# Patient Record
Sex: FEMALE | Race: WHITE | Employment: UNEMPLOYED | URBAN - METROPOLITAN AREA
[De-identification: names, ages, dates, MRNs, and addresses within clinical notes are randomized per-mention and may not be internally consistent; named-entity substitution may affect disease eponyms.]

---

## 2018-03-20 PROBLEM — R92.2 DENSE BREASTS: Status: ACTIVE | Noted: 2018-03-20

## 2018-03-20 PROBLEM — D56.3 BETA THALASSEMIA MINOR: Status: ACTIVE | Noted: 2018-03-20

## 2018-03-20 PROBLEM — D21.9 FIBROID: Status: ACTIVE | Noted: 2018-03-20

## 2018-03-22 PROBLEM — I10 WHITE COAT SYNDROME WITH HYPERTENSION: Status: ACTIVE | Noted: 2018-03-22

## 2018-03-22 PROCEDURE — 88175 CYTOPATH C/V AUTO FLUID REDO: CPT | Performed by: OBSTETRICS & GYNECOLOGY

## 2019-04-30 PROCEDURE — 88175 CYTOPATH C/V AUTO FLUID REDO: CPT | Performed by: OBSTETRICS & GYNECOLOGY

## 2021-09-13 ENCOUNTER — OFFICE VISIT (OUTPATIENT)
Dept: OBGYN CLINIC | Facility: CLINIC | Age: 51
End: 2021-09-13
Payer: COMMERCIAL

## 2021-09-13 VITALS
HEIGHT: 62.5 IN | DIASTOLIC BLOOD PRESSURE: 100 MMHG | SYSTOLIC BLOOD PRESSURE: 172 MMHG | WEIGHT: 145 LBS | BODY MASS INDEX: 26.02 KG/M2

## 2021-09-13 DIAGNOSIS — Z12.4 SCREENING FOR CERVICAL CANCER: ICD-10-CM

## 2021-09-13 DIAGNOSIS — Z01.419 ENCOUNTER FOR GYNECOLOGICAL EXAMINATION WITHOUT ABNORMAL FINDING: Primary | ICD-10-CM

## 2021-09-13 DIAGNOSIS — N92.0 MENORRHAGIA WITH REGULAR CYCLE: ICD-10-CM

## 2021-09-13 PROBLEM — D50.0 IRON DEFICIENCY ANEMIA DUE TO CHRONIC BLOOD LOSS: Status: ACTIVE | Noted: 2021-09-08

## 2021-09-13 PROCEDURE — 3077F SYST BP >= 140 MM HG: CPT | Performed by: OBSTETRICS & GYNECOLOGY

## 2021-09-13 PROCEDURE — 88175 CYTOPATH C/V AUTO FLUID REDO: CPT | Performed by: OBSTETRICS & GYNECOLOGY

## 2021-09-13 PROCEDURE — 87624 HPV HI-RISK TYP POOLED RSLT: CPT | Performed by: OBSTETRICS & GYNECOLOGY

## 2021-09-13 PROCEDURE — 3080F DIAST BP >= 90 MM HG: CPT | Performed by: OBSTETRICS & GYNECOLOGY

## 2021-09-13 PROCEDURE — 3008F BODY MASS INDEX DOCD: CPT | Performed by: OBSTETRICS & GYNECOLOGY

## 2021-09-13 PROCEDURE — 99386 PREV VISIT NEW AGE 40-64: CPT | Performed by: OBSTETRICS & GYNECOLOGY

## 2021-09-13 RX ORDER — HYDROCHLOROTHIAZIDE 12.5 MG/1
12.5 CAPSULE, GELATIN COATED ORAL DAILY
COMMUNITY
Start: 2021-07-23

## 2021-09-13 NOTE — PATIENT INSTRUCTIONS
TREATMENT FOR MENORRHAGIA HEAVY MENSTRUAL CYCLES    EVALUATION  Menorrhagia or irregular/heavy bleeding is usually first evaluated with an ultrasound and sometimes an office endometrial biopsy.   Both of these are required before performing an endometrial a most users are highly satisfied with this device. There are small risks involved with placing the device and these can be discussed with your physician. Lupron/Oriahnn Therapy:   These medications work to reduce your hormones levels similar to what happ ovaries allows your hormone levels to remain unchanged and decline naturally as your approach menopause. The route of surgery varies depending on the size and shape of your uterus as well as your previous surgical and childbirth history.   Routes of surger

## 2021-09-13 NOTE — PROGRESS NOTES
Subjective:  Patient presents with:  Physical: NP    53year old female [id number] presents for annual visit, complaining of anemia from heavy menses. Did have menses two weeks apart in August, but otherwise monthly, 5 day duration, but heavy for 1-2 days. swelling or numbness  Neuro- no numbness    Objective:  BP (!) 172/100   Ht 62.5\"   Wt 145 lb (65.8 kg)   LMP 08/26/2021 (Exact Date)   BMI 26.10 kg/m²    Physical Examination:  General appearance: Well dressed, well nourished in no apparent distress  Lew without abnormal finding    Screening for cervical cancer  -     THINPREP PAP SMEAR B; Future  -     HPV HIGH RISK , THIN PREP COLLECTION;  Future    Menorrhagia with regular cycle    Other orders  -     Cancel: [facility] KACEY 2D+3D SCREENING BILAT (CPT=77067/770

## 2021-09-15 LAB — HPV I/H RISK 1 DNA SPEC QL NAA+PROBE: NEGATIVE

## 2021-09-28 ENCOUNTER — OFFICE VISIT (OUTPATIENT)
Dept: OBGYN CLINIC | Facility: CLINIC | Age: 51
End: 2021-09-28
Payer: COMMERCIAL

## 2021-09-28 ENCOUNTER — ULTRASOUND ENCOUNTER (OUTPATIENT)
Dept: OBGYN CLINIC | Facility: CLINIC | Age: 51
End: 2021-09-28
Payer: COMMERCIAL

## 2021-09-28 VITALS
BODY MASS INDEX: 26.16 KG/M2 | WEIGHT: 145.81 LBS | DIASTOLIC BLOOD PRESSURE: 96 MMHG | SYSTOLIC BLOOD PRESSURE: 142 MMHG | HEART RATE: 83 BPM | HEIGHT: 62.5 IN

## 2021-09-28 DIAGNOSIS — Z01.812 PRE-PROCEDURAL LABORATORY EXAMINATION: Primary | ICD-10-CM

## 2021-09-28 DIAGNOSIS — D21.9 FIBROID: ICD-10-CM

## 2021-09-28 DIAGNOSIS — N92.0 MENORRHAGIA WITH REGULAR CYCLE: ICD-10-CM

## 2021-09-28 LAB
CONTROL LINE PRESENT WITH A CLEAR BACKGROUND (YES/NO): YES YES/NO
PREGNANCY TEST, URINE: NEGATIVE

## 2021-09-28 PROCEDURE — 76856 US EXAM PELVIC COMPLETE: CPT | Performed by: OBSTETRICS & GYNECOLOGY

## 2021-09-28 PROCEDURE — 81025 URINE PREGNANCY TEST: CPT | Performed by: OBSTETRICS & GYNECOLOGY

## 2021-09-28 PROCEDURE — 3080F DIAST BP >= 90 MM HG: CPT | Performed by: OBSTETRICS & GYNECOLOGY

## 2021-09-28 PROCEDURE — 76830 TRANSVAGINAL US NON-OB: CPT | Performed by: OBSTETRICS & GYNECOLOGY

## 2021-09-28 PROCEDURE — 58100 BIOPSY OF UTERUS LINING: CPT | Performed by: OBSTETRICS & GYNECOLOGY

## 2021-09-28 PROCEDURE — 3008F BODY MASS INDEX DOCD: CPT | Performed by: OBSTETRICS & GYNECOLOGY

## 2021-09-28 PROCEDURE — 3077F SYST BP >= 140 MM HG: CPT | Performed by: OBSTETRICS & GYNECOLOGY

## 2021-09-28 RX ORDER — TRANEXAMIC ACID 650 1/1
1300 TABLET ORAL EVERY 8 HOURS
Qty: 90 TABLET | Refills: 2 | Status: SHIPPED | OUTPATIENT
Start: 2021-09-28

## 2021-09-28 NOTE — PROCEDURES
Endometrial Biopsy Procedure Note    Indication and Diagnosis: Menorhagia, fibroids    Procedure Details    Urine pregnancy test negative.   The risks (including infection, bleeding, pain, and uterine perforation) and benefits of the procedure were explaine

## 2021-09-28 NOTE — PROGRESS NOTES
Subjective:  49year old [id number]   Patient presents with:  Procedure: EMG /hcg = neg (student ok)     Patient presents for ultrasound, endometrial biopsy and follow up heavy menses. Menses every 3-5 weeks, duration 5-7 days, heavy for one to two days.   Read  are relatively normal, patient desires expectant management. Bleeding precautions given. Will call office if abnormal uterine bleeding to arrange hysteroscopy, D&C. Desires trial of Lysteda.         Diagnoses and all orders for this visit:    Pre-procedu

## 2022-08-08 ENCOUNTER — TELEPHONE (OUTPATIENT)
Dept: OBGYN CLINIC | Facility: CLINIC | Age: 52
End: 2022-08-08

## 2022-08-08 DIAGNOSIS — R92.2 DENSE BREAST TISSUE ON MAMMOGRAM: Primary | ICD-10-CM

## 2022-08-08 NOTE — TELEPHONE ENCOUNTER
Received mammogram from [address] with the following findings:    - Dense breasts  - \"Bilobed mass seen in the right breast at 12 o'clock, 3cm from the nipple. Fibrocystic changes were demonstrated in this area previously in 2020 with current findings larger. Recommend ultrasound to assure stability of its ultrasound appearance. \"    - Impression and recommendation:  Additional sonographic imaging is recommended. - Right    Patient states she has already scheduled the [address]. No further questions. Order placed. Results left for Dr. Dahiana Juarez to review in [place].

## 2022-08-16 ENCOUNTER — TELEPHONE (OUTPATIENT)
Dept: OBGYN CLINIC | Facility: CLINIC | Age: 52
End: 2022-08-16

## 2022-08-16 NOTE — TELEPHONE ENCOUNTER
Received breast US results from [facility].  Placed in Dr Thomas rahman in [place] for review

## (undated) NOTE — MR AVS SNAPSHOT
After Visit Summary   9/13/2021    Alexa Richardson   MRN: [id number]           Visit Information     Date & Time  9/13/2021 11:30 AM Provider  Lourdes Alas MD [place] [address], [place] [address], [facility]  Phone  [phone]      Y CYCLES    EVALUATION  Menorrhagia or irregular/heavy bleeding is usually first evaluated with an ultrasound and sometimes an office endometrial biopsy. Both of these are required before performing an endometrial ablation.   Depending on your personal medic device. There are small risks involved with placing the device and these can be discussed with your physician. Lupron/Oriahnn Therapy: These medications work to reduce your hormones levels similar to what happens during menopause.   The reduction in ho remain unchanged and decline naturally as your approach menopause. The route of surgery varies depending on the size and shape of your uterus as well as your previous surgical and childbirth history.   Routes of surgery include through the vagina, laparosc 8:00 am – 8:00 pm   Saturday – Sunday  8:00 am – 4:00 pm    WALK-IN CARE  Primary Care Providers  Treatment for acute illness   or injury that are   non-life-threatening.   Also available by appointment     Average cost  $70*       Anderson Fletcher

## (undated) NOTE — LETTER
Socorro Herrera, :1970    CONSENT FOR PROCEDURE/SEDATION    1. I authorize the performance upon Socorro Herrera  the following: Endometrial biopsy procedure    2.  I authorize Dr. Maximus Marquez MD (and whomever is designated as the doctor’s assistant) ____________________________________________    Witness: _________________________________________ Date:___________     Physician Signature: _______________________________ Date:___________